# Patient Record
Sex: FEMALE | Race: WHITE | NOT HISPANIC OR LATINO | Employment: FULL TIME | URBAN - METROPOLITAN AREA
[De-identification: names, ages, dates, MRNs, and addresses within clinical notes are randomized per-mention and may not be internally consistent; named-entity substitution may affect disease eponyms.]

---

## 2018-10-04 ENCOUNTER — OFFICE VISIT (OUTPATIENT)
Dept: FAMILY MEDICINE | Facility: CLINIC | Age: 28
End: 2018-10-04
Payer: COMMERCIAL

## 2018-10-04 VITALS
OXYGEN SATURATION: 97 % | SYSTOLIC BLOOD PRESSURE: 138 MMHG | HEIGHT: 63 IN | HEART RATE: 88 BPM | RESPIRATION RATE: 12 BRPM | DIASTOLIC BLOOD PRESSURE: 80 MMHG | BODY MASS INDEX: 48.9 KG/M2 | TEMPERATURE: 98.2 F | WEIGHT: 276 LBS

## 2018-10-04 DIAGNOSIS — J30.2 SEASONAL ALLERGIC RHINITIS, UNSPECIFIED TRIGGER: Primary | ICD-10-CM

## 2018-10-04 DIAGNOSIS — H65.01 RIGHT ACUTE SEROUS OTITIS MEDIA, RECURRENCE NOT SPECIFIED: ICD-10-CM

## 2018-10-04 DIAGNOSIS — H92.01 RIGHT EAR PAIN: ICD-10-CM

## 2018-10-04 PROCEDURE — 99213 OFFICE O/P EST LOW 20 MIN: CPT | Performed by: NURSE PRACTITIONER

## 2018-10-04 RX ORDER — CETIRIZINE HYDROCHLORIDE 10 MG/1
10 TABLET ORAL EVERY EVENING
Qty: 30 TABLET | Refills: 3 | Status: SHIPPED | OUTPATIENT
Start: 2018-10-04

## 2018-10-04 RX ORDER — FLUTICASONE PROPIONATE 50 MCG
1 SPRAY, SUSPENSION (ML) NASAL 2 TIMES DAILY PRN
Qty: 1 BOTTLE | Refills: 3 | Status: SHIPPED | OUTPATIENT
Start: 2018-10-04

## 2018-10-04 NOTE — PATIENT INSTRUCTIONS
1.  Tylenol/Ibuprofen for pain.  2.  Start Zyrtec once daily until symptoms subside and try to discontinue.  3.  Use Flonase twice daily as needed for ear and runny nose symptoms.  4.  Follow-up in clinic if any worsening symptoms.

## 2018-10-04 NOTE — MR AVS SNAPSHOT
"              After Visit Summary   10/4/2018    Josie Guillaume    MRN: [id number]           Patient Information     Date Of Birth          1990        Visit Information        Provider Department      10/4/2018 9:20 AM Josie Duke NP [facility]        Today's Diagnoses     Seasonal allergic rhinitis, unspecified trigger    -  1    Right ear pain        Right acute serous otitis media, recurrence not specified          Care Instructions    1.  Tylenol/Ibuprofen for pain.  2.  Start Zyrtec once daily until symptoms subside and try to discontinue.  3.  Use Flonase twice daily as needed for ear and runny nose symptoms.  4.  Follow-up in clinic if any worsening symptoms.            Follow-ups after your visit        Follow-up notes from your care team     Return in about 1 week (around 10/11/2018).      Who to contact     If you have questions or need follow up information about today's clinic visit or your schedule please contact [facility] directly at [phone].  Normal or non-critical lab and imaging results will be communicated to you by MyChart, letter or phone within 4 business days after the clinic has received the results. If you do not hear from us within 7 days, please contact the clinic through [employer] or phone. If you have a critical or abnormal lab result, we will notify you by phone as soon as possible.  Submit refill requests through [employer] or call your pharmacy and they will forward the refill request to us. Please allow 3 business days for your refill to be completed.          Additional Information About Your Visit        Care EveryWhere ID     This is your Care EveryWhere ID. This could be used by other organizations to access your [facility] medical records  [id number]        Your Vitals Were     Pulse Temperature Respirations Height Pulse Oximetry BMI (Body Mass Index)    88 98.2  F (36.8  C) (Tympanic) 12 5' 2.5\" (1.588 m) 97% 49.68 kg/m2 "       Blood Pressure from Last 3 Encounters:   10/04/18 138/80    Weight from Last 3 Encounters:   10/04/18 276 lb (125.2 kg)              Today, you had the following     No orders found for display         Today's Medication Changes          These changes are accurate as of 10/4/18  9:47 AM.  If you have any questions, ask your nurse or doctor.               Start taking these medicines.        Dose/Directions    cetirizine 10 MG tablet   Commonly known as:  zyrTEC   Used for:  Seasonal allergic rhinitis, unspecified trigger   Started by:  Josie Duke NP        Dose:  10 mg   Take 1 tablet (10 mg) by mouth every evening   Quantity:  30 tablet   Refills:  3       fluticasone 50 MCG/ACT spray   Commonly known as:  FLONASE   Used for:  Right acute serous otitis media, recurrence not specified, Seasonal allergic rhinitis, unspecified trigger   Started by:  Josie Duke NP        Dose:  1 spray   Spray 1 spray into both nostrils 2 times daily as needed for rhinitis or allergies   Quantity:  1 Bottle   Refills:  3            Where to get your medicines      These medications were sent to [facility] [place] [address]     Phone:  [phone]     cetirizine 10 MG tablet    fluticasone 50 MCG/ACT spray                Primary Care Provider Fax #    Physician No Ref-Primary [phone]       No address on file        Equal Access to Services     NOEMY ORNELAS AH: Michelet dodsono Soduncan, waaxda luqadaha, qaybta kaalmada adeegyada, radha tomlinson. So [facility] [phone].    ATENCIÓN: Si habla español, tiene a grover disposición servicios gratuitos de asistencia lingüística. Llame al [phone].    We comply with applicable federal civil rights laws and [place] laws. We do not discriminate on the basis of race, color, national origin, age, disability, sex, sexual orientation, or gender  identity.            Thank you!     Thank you for choosing [facility]  for your care. Our goal is always to provide you with excellent care. Hearing back from our patients is one way we can continue to improve our services. Please take a few minutes to complete the written survey that you may receive in the mail after your visit with us. Thank you!             Your Updated Medication List - Protect others around you: Learn how to safely use, store and throw away your medicines at www.disposemymeds.org.          This list is accurate as of 10/4/18  9:47 AM.  Always use your most recent med list.                   Brand Name Dispense Instructions for use Diagnosis    cetirizine 10 MG tablet    zyrTEC    30 tablet    Take 1 tablet (10 mg) by mouth every evening    Seasonal allergic rhinitis, unspecified trigger       fluticasone 50 MCG/ACT spray    FLONASE    1 Bottle    Spray 1 spray into both nostrils 2 times daily as needed for rhinitis or allergies    Right acute serous otitis media, recurrence not specified, Seasonal allergic rhinitis, unspecified trigger
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
## 2024-01-05 ENCOUNTER — OFFICE VISIT (OUTPATIENT)
Dept: FAMILY MEDICINE | Facility: CLINIC | Age: 34
End: 2024-01-05
Payer: COMMERCIAL

## 2024-01-05 VITALS
BODY MASS INDEX: 38.45 KG/M2 | SYSTOLIC BLOOD PRESSURE: 106 MMHG | OXYGEN SATURATION: 98 % | DIASTOLIC BLOOD PRESSURE: 78 MMHG | TEMPERATURE: 97.8 F | RESPIRATION RATE: 24 BRPM | HEIGHT: 63 IN | WEIGHT: 217 LBS | HEART RATE: 96 BPM

## 2024-01-05 DIAGNOSIS — J06.9 UPPER RESPIRATORY TRACT INFECTION, UNSPECIFIED TYPE: Primary | ICD-10-CM

## 2024-01-05 DIAGNOSIS — J02.9 SORE THROAT: ICD-10-CM

## 2024-01-05 LAB
DEPRECATED S PYO AG THROAT QL EIA: NEGATIVE
FLUAV AG SPEC QL IA: NEGATIVE
FLUBV AG SPEC QL IA: NEGATIVE
GROUP A STREP BY PCR: ABNORMAL

## 2024-01-05 PROCEDURE — 99203 OFFICE O/P NEW LOW 30 MIN: CPT | Performed by: FAMILY MEDICINE

## 2024-01-05 PROCEDURE — 87804 INFLUENZA ASSAY W/OPTIC: CPT | Performed by: FAMILY MEDICINE

## 2024-01-05 ASSESSMENT — ENCOUNTER SYMPTOMS
FATIGUE: 1
COUGH: 1
FEVER: 1
RHINORRHEA: 1
SORE THROAT: 1
APPETITE CHANGE: 0
ACTIVITY CHANGE: 0
SHORTNESS OF BREATH: 0

## 2024-01-05 ASSESSMENT — PAIN SCALES - GENERAL: PAINLEVEL: MILD PAIN (3)

## 2024-01-05 NOTE — PROGRESS NOTES
"  Assessment & Plan     Josie was seen today for uri.    Diagnoses and all orders for this visit:    Upper respiratory tract infection, unspecified type  -     Influenza A & B Antigen - Clinic Collect  -covid    Labs as above. Continue with supportive cares, ibuprofen and tylenol prn following instructions on label. Continue mucinex. Take 1-2 teaspoon of honey before bed for sore throat.     Sore throat  -     Streptococcus A Rapid Screen w/Reflex to PCR - Clinic Collect    Potential exposure with boyfriend.    Other orders  -     REVIEW OF HEALTH MAINTENANCE PROTOCOL ORDERS                  BMI:   Estimated body mass index is 38.44 kg/m  as calculated from the following:    Height as of this encounter: 1.6 m (5' 3\").    Weight as of this encounter: 98.4 kg (217 lb).           Robyn Brito MD  [facility]    Subjective   Josie is a 33 year old, presenting for the following health issues:  URI      1/5/2024    12:59 PM   Additional Questions   Roomed by Sarita       URI  Associated symptoms include congestion, coughing, fatigue, a fever and a sore throat. Pertinent negatives include no chest pain.          Acute Illness  Acute illness concerns: cough, sore throat  Onset/Duration: 5 days  Symptoms:  Fever: YES- 104 Tues  Chills/Sweats: YES  Headache (location?): YES front  Sinus Pressure: No  Conjunctivitis:  No  Ear Pain: YES: bilateral  Rhinorrhea: YES  Congestion: YES  Sore Throat: YES  Cough: YES-non-productive  Wheeze: No  Decreased Appetite: YES  Nausea: No  Vomiting: No  Diarrhea: No  Dysuria/Freq.: No  Dysuria or Hematuria: No  Fatigue/Achiness: YES- achiness  Sick/Strep Exposure: YES-   Therapies tried and outcome: mucinex, allergie meds,  mouth sprays      Sore throat  Congestion ear pain    Review of Systems   Constitutional:  Positive for fatigue and fever. Negative for activity change and appetite change.   HENT:  Positive for congestion, ear pain, rhinorrhea and sore " "throat.    Respiratory:  Positive for cough. Negative for shortness of breath.    Cardiovascular:  Negative for chest pain.            Objective    /78 (BP Location: Right arm)   Pulse 96   Temp 97.8  F (36.6  C) (Tympanic)   Resp 24   Ht 1.6 m (5' 3\")   Wt 98.4 kg (217 lb)   LMP 12/13/2023   SpO2 98%   BMI 38.44 kg/m    Body mass index is 38.44 kg/m .  Physical Exam  Vitals reviewed.   HENT:      Right Ear: Tympanic membrane normal.      Left Ear: Tympanic membrane normal.      Nose: Congestion and rhinorrhea present.      Mouth/Throat:      Pharynx: Posterior oropharyngeal erythema present. No oropharyngeal exudate.   Eyes:      Extraocular Movements: Extraocular movements intact.      Conjunctiva/sclera: Conjunctivae normal.      Pupils: Pupils are equal, round, and reactive to light.   Cardiovascular:      Rate and Rhythm: Normal rate.   Pulmonary:      Effort: Pulmonary effort is normal.   Neurological:      Mental Status: She is alert.                              "

## 2024-02-04 ENCOUNTER — HEALTH MAINTENANCE LETTER (OUTPATIENT)
Age: 34
End: 2024-02-04

## 2025-03-02 ENCOUNTER — HEALTH MAINTENANCE LETTER (OUTPATIENT)
Age: 35
End: 2025-03-02